# Patient Record
Sex: FEMALE | Race: WHITE | HISPANIC OR LATINO
[De-identification: names, ages, dates, MRNs, and addresses within clinical notes are randomized per-mention and may not be internally consistent; named-entity substitution may affect disease eponyms.]

---

## 2019-01-21 ENCOUNTER — HOSPITAL (OUTPATIENT)
Dept: OTHER | Age: 24
End: 2019-01-21

## 2019-08-03 ENCOUNTER — EMERGENCY (EMERGENCY)
Facility: HOSPITAL | Age: 24
LOS: 1 days | Discharge: HOME | End: 2019-08-03
Attending: EMERGENCY MEDICINE | Admitting: EMERGENCY MEDICINE
Payer: COMMERCIAL

## 2019-08-03 VITALS
OXYGEN SATURATION: 96 % | DIASTOLIC BLOOD PRESSURE: 50 MMHG | HEIGHT: 64 IN | HEART RATE: 57 BPM | RESPIRATION RATE: 18 BRPM | SYSTOLIC BLOOD PRESSURE: 91 MMHG | WEIGHT: 117.07 LBS | TEMPERATURE: 98 F

## 2019-08-03 VITALS
DIASTOLIC BLOOD PRESSURE: 62 MMHG | OXYGEN SATURATION: 100 % | RESPIRATION RATE: 17 BRPM | SYSTOLIC BLOOD PRESSURE: 123 MMHG | HEART RATE: 75 BPM

## 2019-08-03 DIAGNOSIS — M25.522 PAIN IN LEFT ELBOW: ICD-10-CM
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
## 2019-08-03 DIAGNOSIS — S53.105A UNSPECIFIED DISLOCATION OF LEFT ULNOHUMERAL JOINT, INITIAL ENCOUNTER: ICD-10-CM
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
## 2019-08-03 PROCEDURE — 99284 EMERGENCY DEPT VISIT MOD MDM: CPT | Mod: 25

## 2019-08-03 PROCEDURE — 96374 THER/PROPH/DIAG INJ IV PUSH: CPT | Mod: XU

## 2019-08-03 PROCEDURE — 73070 X-RAY EXAM OF ELBOW: CPT

## 2019-08-03 PROCEDURE — 73080 X-RAY EXAM OF ELBOW: CPT | Mod: 26,LT

## 2019-08-03 PROCEDURE — 24600 TX CLSD ELBOW DISLC W/O ANES: CPT | Mod: LT

## 2019-08-03 PROCEDURE — 73080 X-RAY EXAM OF ELBOW: CPT

## 2019-08-03 PROCEDURE — 73070 X-RAY EXAM OF ELBOW: CPT | Mod: 26,76,LT

## 2019-08-03 RX ORDER — KETAMINE HYDROCHLORIDE 100 MG/ML
55 INJECTION INTRAMUSCULAR; INTRAVENOUS ONCE
Refills: 0 | Status: DISCONTINUED | OUTPATIENT
Start: 2019-08-03 | End: 2019-08-03

## 2019-08-03 RX ORDER — MORPHINE SULFATE 50 MG/1
4 CAPSULE, EXTENDED RELEASE ORAL ONCE
Refills: 0 | Status: DISCONTINUED | OUTPATIENT
Start: 2019-08-03 | End: 2019-08-03

## 2019-08-03 RX ORDER — OXYCODONE AND ACETAMINOPHEN 5; 325 MG/1; MG/1
1 TABLET ORAL ONCE
Refills: 0 | Status: DISCONTINUED | OUTPATIENT
Start: 2019-08-03 | End: 2019-08-03

## 2019-08-03 RX ADMIN — OXYCODONE AND ACETAMINOPHEN 1 TABLET(S): 5; 325 TABLET ORAL at 12:47

## 2019-08-03 RX ADMIN — MORPHINE SULFATE 4 MILLIGRAM(S): 50 CAPSULE, EXTENDED RELEASE ORAL at 13:20

## 2019-08-03 NOTE — ED PROVIDER NOTE - DIAGNOSTIC INTERPRETATION
Radiology Interpretation performed  by ED BRANDYN Cesar  Left elbow x-ray, Lateral + AP  + Oblique views  Left elbow dislocated. No acute fracture noted. No SAIL sign appreciated.

## 2019-08-03 NOTE — ED PROVIDER NOTE - CLINICAL SUMMARY MEDICAL DECISION MAKING FREE TEXT BOX
Patient with left elbow dislocation s/p fall. Elbow was reduced by ortho and casted. Recommend f/u with ortho and continue to wear the sling. Pain meds as necessary.

## 2019-08-03 NOTE — ED PROVIDER NOTE - CARE PROVIDER_API CALL
Ryan Hood)  Orthopaedic Surgery; Sports Medicine  [address], [address] [place] [address]  Phone: [phone]  Fax: [phone]  Follow Up Time:

## 2019-08-03 NOTE — ED ADULT NURSE REASSESSMENT NOTE - NS ED NURSE REASSESS COMMENT FT1
Ortho attending at bedside and manipulated left elbow dislocation back in without use of sedatives.  Per Ortho attending conscious sedation not necessary.  Patient verbalized relief of pain from 4/10 to 0/10.  Ortho team will cast left elbow.  Pending repeat xrays.  Patient verbalized relief of pain and remains in stable condition.  VSS.  Continue to monitor. Ortho attending Luis at bedside and manipulated left elbow dislocation back in without use of sedatives.  Per Ortho attending conscious sedation not necessary.  Patient verbalized relief of pain from 4/10 to 0/10.  Ortho team will cast left elbow.  Pending repeat xrays.  Patient verbalized relief of pain and remains in stable condition.  VSS.  Continue to monitor.

## 2019-08-03 NOTE — ED PROVIDER NOTE - ATTENDING CONTRIBUTION TO CARE
23F found to have L elbow dislocation on xray s/p ground level mechanical fall while rollerblading. neurovasc intact. ortho consulted. s/p uncomplicated reduction and splint by ortho. will dc home w/ outpatient pcp fu, otc ibuprofen, strict return precautions. pt agrees w/ plan. questions answered.     I saw and discussed the care of the pt directly with the ACP while the pt was in the ED. i have reviewed the ACP note and agree w/ the history, exam and plan of care other than as noted above.

## 2019-08-03 NOTE — ED PROVIDER NOTE - PROGRESS NOTE DETAILS
Conscious sedation was considered by orthopedics but decided to manually reduce without conscious sedation.

## 2019-08-03 NOTE — CONSULT NOTE ADULT - SUBJECTIVE AND OBJECTIVE BOX
Orthopaedic Surgery Consult Note    For Surgeon:    HPI:  23yFemale  Patient is a 23y old  Female who presents with a chief complaint of   HPI:      Allergies    No Known Allergies    Intolerances      PAST MEDICAL & SURGICAL HISTORY:    MEDICATIONS  (STANDING):    MEDICATIONS  (PRN):      Vital Signs Last 24 Hrs  T(C): 36.8 (03 Aug 2019 12:07), Max: 36.8 (03 Aug 2019 12:07)  T(F): 98.2 (03 Aug 2019 12:07), Max: 98.2 (03 Aug 2019 12:07)  HR: 75 (03 Aug 2019 15:05) (57 - 75)  BP: 123/62 (03 Aug 2019 15:05) (91/50 - 123/62)  BP(mean): --  RR: 17 (03 Aug 2019 15:05) (17 - 18)  SpO2: 100% (03 Aug 2019 15:05) (96% - 100%)    Physical Exam:              Imaging:     A/P: 23yFemale    -Discussed with  Orthopaedic Surgery Consult Note    For Surgeon: Sana    HPI:  23F RHD s/p fall from roller blading landing on her left elbow. Reports that she was wearing protective gear. L elbow pain, swelling and deformity. Denies head trauma or LOC. No other injuries. Denies numbness or tingling.      Allergies    No Known Allergies    Intolerances      PAST MEDICAL & SURGICAL HISTORY:    MEDICATIONS  (STANDING):    MEDICATIONS  (PRN):      Vital Signs Last 24 Hrs  T(C): 36.8 (03 Aug 2019 12:07), Max: 36.8 (03 Aug 2019 12:07)  T(F): 98.2 (03 Aug 2019 12:07), Max: 98.2 (03 Aug 2019 12:07)  HR: 75 (03 Aug 2019 15:05) (57 - 75)  BP: 123/62 (03 Aug 2019 15:05) (91/50 - 123/62)  BP(mean): --  RR: 17 (03 Aug 2019 15:05) (17 - 18)  SpO2: 100% (03 Aug 2019 15:05) (96% - 100%)    Physical Exam:  Gen: Awake and alert  LUE: swelling and deformity of elbow  TTP  no open wounds, erythema or ecchymosis   Sensation intact C5-T1  Able to shoulder shrug and range wrist  Unable to range elbow 2/2 pain   2+ radial pulse  fingers wwp      Imaging:   L elbow posterior dislocation     A/P: 23yFemale s/p fall with l elbow posterior dislocation     - reduced, placed in posterior splint, sling  - remained neurovasc intact post reduction   - postop reduction xrays  - LUE elevation   - NWB LUE  - pain control  - outpt f/u with Dr. Hood    -Discussed with Dr. Hood

## 2019-08-03 NOTE — ED PROVIDER NOTE - MUSCULOSKELETAL, MLM
C-spine non-tender, FROM. Left elbow with deformity. Able to minimal move wrist distally and fingers. No motor sensory deficits. neurovascularly intact. Shoulder non-tender. C-spine non-tender, FROM. Left elbow with deformity. Able to minimally move wrist distally and fingers. No motor sensory deficits. neurovascularly intact. Shoulder non-tender.

## 2019-08-03 NOTE — ED PROVIDER NOTE - OBJECTIVE STATEMENT
21 y/o Female with no PMHx c/o left elbow injury today while rolling blading. She fell onto her left elbow sustaining pain. Denies LOC, head injury, SOB, CP,  numbness, tingling, and abdominal pain. 23 y/o Female with no PMHx c/o left elbow injury today while rolling blading. She fell onto her left elbow. Admit of deformity , pain, and swelling. Denies LOC, head injury, SOB, CP,  numbness, tingling, and abdominal pain. No pain to shoulder or wrist.

## 2019-08-03 NOTE — ED ADULT NURSE NOTE - OBJECTIVE STATEMENT
pt to ER w/ report of falling on R elbow while rollerblading.  Pt noted to have deformity to R elbow and reports pain.  Pt denies striking head, LOC or other injury.  Pt denies cp/sob/f/c/n/v.  Breathing unlabored, skin warm and dry.  Will continue to monitor. pt to ER w/ report of falling on L elbow while rollerblading.  Pt noted to have deformity to R elbow and reports pain.  Pt denies striking head, LOC or other injury.  Pt denies cp/sob/f/c/n/v.  Breathing unlabored, skin warm and dry.  Will continue to monitor.

## 2019-08-03 NOTE — ED ADULT NURSE REASSESSMENT NOTE - NS ED NURSE REASSESS COMMENT FT1
Patient verbalized she has an IUD and had her last period last week.  BRANDYN Cesar and MD Pearce notified.

## 2025-02-04 ENCOUNTER — APPOINTMENT (OUTPATIENT)
Dept: INTERNAL MEDICINE | Facility: CLINIC | Age: 30
End: 2025-02-04
Payer: COMMERCIAL

## 2025-02-04 VITALS
BODY MASS INDEX: 21.85 KG/M2 | DIASTOLIC BLOOD PRESSURE: 73 MMHG | HEART RATE: 68 BPM | OXYGEN SATURATION: 97 % | TEMPERATURE: 98 F | WEIGHT: 128 LBS | SYSTOLIC BLOOD PRESSURE: 127 MMHG | HEIGHT: 64 IN

## 2025-02-04 DIAGNOSIS — F41.8 OTHER SPECIFIED ANXIETY DISORDERS: ICD-10-CM

## 2025-02-04 DIAGNOSIS — Z82.3 FAMILY HISTORY OF STROKE: ICD-10-CM

## 2025-02-04 DIAGNOSIS — Z00.00 ENCOUNTER FOR GENERAL ADULT MEDICAL EXAMINATION W/OUT ABNORMAL FINDINGS: ICD-10-CM

## 2025-02-04 DIAGNOSIS — D50.0 IRON DEFICIENCY ANEMIA SECONDARY TO BLOOD LOSS (CHRONIC): ICD-10-CM

## 2025-02-04 DIAGNOSIS — Z83.3 FAMILY HISTORY OF DIABETES MELLITUS: ICD-10-CM

## 2025-02-04 DIAGNOSIS — G47.9 SLEEP DISORDER, UNSPECIFIED: ICD-10-CM

## 2025-02-04 PROCEDURE — 99385 PREV VISIT NEW AGE 18-39: CPT

## 2025-02-04 PROCEDURE — 36415 COLL VENOUS BLD VENIPUNCTURE: CPT

## 2025-02-04 PROCEDURE — 99212 OFFICE O/P EST SF 10 MIN: CPT | Mod: 25

## 2025-02-04 RX ORDER — ESCITALOPRAM OXALATE 20 MG/1
TABLET ORAL DAILY
Refills: 0 | Status: ACTIVE | COMMUNITY

## 2025-02-04 RX ORDER — BUPROPION HYDROCHLORIDE 300 MG/1
300 TABLET, EXTENDED RELEASE ORAL DAILY
Qty: 30 | Refills: 2 | Status: ACTIVE | COMMUNITY

## 2025-02-04 RX ORDER — BUSPIRONE HYDROCHLORIDE 7.5 MG/1
TABLET ORAL 3 TIMES DAILY
Refills: 0 | Status: ACTIVE | COMMUNITY

## 2025-02-10 DIAGNOSIS — R74.01 ELEVATION OF LEVELS OF LIVER TRANSAMINASE LEVELS: ICD-10-CM

## 2025-02-10 LAB
ALBUMIN SERPL ELPH-MCNC: 4.8 G/DL
ALP BLD-CCNC: 57 U/L
ALT SERPL-CCNC: 34 U/L
ANION GAP SERPL CALC-SCNC: 12 MMOL/L
AST SERPL-CCNC: 57 U/L
BILIRUB SERPL-MCNC: 0.3 MG/DL
BUN SERPL-MCNC: 12 MG/DL
CALCIUM SERPL-MCNC: 9.9 MG/DL
CHLORIDE SERPL-SCNC: 102 MMOL/L
CHOLEST SERPL-MCNC: 149 MG/DL
CO2 SERPL-SCNC: 25 MMOL/L
CREAT SERPL-MCNC: 0.78 MG/DL
EGFR: 105 ML/MIN/1.73M2
FERRITIN SERPL-MCNC: 55 NG/ML
GLUCOSE SERPL-MCNC: 93 MG/DL
HCT VFR BLD CALC: 39.9 %
HDLC SERPL-MCNC: 65 MG/DL
HGB BLD-MCNC: 13.7 G/DL
IRON SATN MFR SERPL: 38 %
IRON SERPL-MCNC: 116 UG/DL
LDLC SERPL CALC-MCNC: 69 MG/DL
MCHC RBC-ENTMCNC: 31.5 PG
MCHC RBC-ENTMCNC: 34.3 G/DL
MCV RBC AUTO: 91.7 FL
NONHDLC SERPL-MCNC: 84 MG/DL
PLATELET # BLD AUTO: 336 K/UL
POTASSIUM SERPL-SCNC: 4.1 MMOL/L
PROT SERPL-MCNC: 7.6 G/DL
RBC # BLD: 4.35 M/UL
RBC # FLD: 12.5 %
SODIUM SERPL-SCNC: 138 MMOL/L
TIBC SERPL-MCNC: 308 UG/DL
TRIGL SERPL-MCNC: 80 MG/DL
TSH SERPL-ACNC: 3.29 UIU/ML
UIBC SERPL-MCNC: 192 UG/DL
WBC # FLD AUTO: 7.71 K/UL

## 2025-02-20 LAB
ALBUMIN SERPL ELPH-MCNC: 4.8 G/DL
ALP BLD-CCNC: 76 U/L
ALT SERPL-CCNC: 16 U/L
AST SERPL-CCNC: 26 U/L
BILIRUB DIRECT SERPL-MCNC: 0.1 MG/DL
BILIRUB INDIRECT SERPL-MCNC: 0.3 MG/DL
BILIRUB SERPL-MCNC: 0.4 MG/DL
PROT SERPL-MCNC: 7.6 G/DL

## 2025-02-25 ENCOUNTER — NON-APPOINTMENT (OUTPATIENT)
Age: 30
End: 2025-02-25

## 2025-03-03 ENCOUNTER — APPOINTMENT (OUTPATIENT)
Dept: PULMONOLOGY | Facility: CLINIC | Age: 30
End: 2025-03-03
Payer: COMMERCIAL

## 2025-03-03 VITALS
DIASTOLIC BLOOD PRESSURE: 68 MMHG | TEMPERATURE: 98.4 F | RESPIRATION RATE: 13 BRPM | OXYGEN SATURATION: 98 % | BODY MASS INDEX: 22.53 KG/M2 | HEART RATE: 67 BPM | HEIGHT: 64 IN | SYSTOLIC BLOOD PRESSURE: 111 MMHG | WEIGHT: 132 LBS

## 2025-03-03 DIAGNOSIS — G47.33 OBSTRUCTIVE SLEEP APNEA (ADULT) (PEDIATRIC): ICD-10-CM

## 2025-03-03 DIAGNOSIS — R40.0 SOMNOLENCE: ICD-10-CM

## 2025-03-03 DIAGNOSIS — R06.83 SNORING: ICD-10-CM

## 2025-03-03 DIAGNOSIS — G47.9 SLEEP DISORDER, UNSPECIFIED: ICD-10-CM

## 2025-03-03 PROCEDURE — 99204 OFFICE O/P NEW MOD 45 MIN: CPT

## 2025-03-03 PROCEDURE — G2211 COMPLEX E/M VISIT ADD ON: CPT | Mod: NC

## 2025-03-04 PROBLEM — G47.33 OSA (OBSTRUCTIVE SLEEP APNEA): Status: ACTIVE | Noted: 2025-03-04

## 2025-03-04 PROBLEM — R06.83 SNORING: Status: ACTIVE | Noted: 2025-03-04

## 2025-03-04 PROBLEM — R40.0 DAYTIME SLEEPINESS: Status: ACTIVE | Noted: 2025-03-04

## 2025-03-17 ENCOUNTER — TRANSCRIPTION ENCOUNTER (OUTPATIENT)
Age: 30
End: 2025-03-17

## 2025-04-11 ENCOUNTER — NON-APPOINTMENT (OUTPATIENT)
Age: 30
End: 2025-04-11

## 2025-07-18 ENCOUNTER — APPOINTMENT (OUTPATIENT)
Dept: PULMONOLOGY | Facility: CLINIC | Age: 30
End: 2025-07-18
Payer: COMMERCIAL

## 2025-07-18 VITALS
WEIGHT: 138.25 LBS | OXYGEN SATURATION: 98 % | TEMPERATURE: 98.3 F | RESPIRATION RATE: 12 BRPM | SYSTOLIC BLOOD PRESSURE: 123 MMHG | BODY MASS INDEX: 23.6 KG/M2 | HEART RATE: 73 BPM | DIASTOLIC BLOOD PRESSURE: 75 MMHG | HEIGHT: 64 IN

## 2025-07-18 PROCEDURE — 99214 OFFICE O/P EST MOD 30 MIN: CPT

## 2025-07-18 PROCEDURE — G2211 COMPLEX E/M VISIT ADD ON: CPT | Mod: NC
